# Patient Record
Sex: MALE | Race: WHITE | NOT HISPANIC OR LATINO | Employment: FULL TIME | URBAN - METROPOLITAN AREA
[De-identification: names, ages, dates, MRNs, and addresses within clinical notes are randomized per-mention and may not be internally consistent; named-entity substitution may affect disease eponyms.]

---

## 2017-06-22 ENCOUNTER — ALLSCRIPTS OFFICE VISIT (OUTPATIENT)
Dept: OTHER | Facility: OTHER | Age: 32
End: 2017-06-22

## 2018-01-13 VITALS
HEART RATE: 74 BPM | RESPIRATION RATE: 16 BRPM | WEIGHT: 188 LBS | TEMPERATURE: 97.4 F | SYSTOLIC BLOOD PRESSURE: 112 MMHG | BODY MASS INDEX: 26.92 KG/M2 | DIASTOLIC BLOOD PRESSURE: 72 MMHG | HEIGHT: 70 IN

## 2018-11-18 PROBLEM — J30.9 ALLERGIC RHINITIS: Status: ACTIVE | Noted: 2017-06-22

## 2018-11-18 RX ORDER — FLUTICASONE PROPIONATE 50 MCG
2 SPRAY, SUSPENSION (ML) NASAL DAILY
COMMUNITY
Start: 2017-06-22 | End: 2018-11-21 | Stop reason: ALTCHOICE

## 2018-11-18 RX ORDER — LORATADINE 10 MG/1
1 TABLET ORAL
COMMUNITY
Start: 2017-06-22 | End: 2018-11-21 | Stop reason: ALTCHOICE

## 2018-11-21 ENCOUNTER — OFFICE VISIT (OUTPATIENT)
Dept: FAMILY MEDICINE CLINIC | Facility: CLINIC | Age: 33
End: 2018-11-21
Payer: COMMERCIAL

## 2018-11-21 VITALS
DIASTOLIC BLOOD PRESSURE: 80 MMHG | SYSTOLIC BLOOD PRESSURE: 126 MMHG | WEIGHT: 208 LBS | RESPIRATION RATE: 16 BRPM | BODY MASS INDEX: 30.81 KG/M2 | HEIGHT: 69 IN | HEART RATE: 72 BPM | TEMPERATURE: 97.6 F

## 2018-11-21 DIAGNOSIS — M67.431 GANGLION CYST OF WRIST, RIGHT: Primary | ICD-10-CM

## 2018-11-21 DIAGNOSIS — Z13.1 SCREENING FOR DIABETES MELLITUS (DM): ICD-10-CM

## 2018-11-21 DIAGNOSIS — E66.9 OBESITY (BMI 30-39.9): ICD-10-CM

## 2018-11-21 DIAGNOSIS — Z13.89 SCREENING FOR CARDIOVASCULAR, RESPIRATORY, AND GENITOURINARY DISEASES: ICD-10-CM

## 2018-11-21 DIAGNOSIS — Z13.83 SCREENING FOR CARDIOVASCULAR, RESPIRATORY, AND GENITOURINARY DISEASES: ICD-10-CM

## 2018-11-21 DIAGNOSIS — Z13.6 SCREENING FOR CARDIOVASCULAR, RESPIRATORY, AND GENITOURINARY DISEASES: ICD-10-CM

## 2018-11-21 PROCEDURE — 3008F BODY MASS INDEX DOCD: CPT | Performed by: FAMILY MEDICINE

## 2018-11-21 PROCEDURE — 99213 OFFICE O/P EST LOW 20 MIN: CPT | Performed by: FAMILY MEDICINE

## 2018-11-21 PROCEDURE — 1036F TOBACCO NON-USER: CPT | Performed by: FAMILY MEDICINE

## 2018-11-21 NOTE — PROGRESS NOTES
Assessment/Plan:    No problem-specific Assessment & Plan notes found for this encounter  He can call anytime for a referral to Dr Lorin Benavidez if bothersome or tender  phm labs ordered     Diagnoses and all orders for this visit:    Ganglion cyst of wrist, right    Screening for cardiovascular, respiratory, and genitourinary diseases  -     Lipid Panel with Direct LDL reflex; Future    Screening for diabetes mellitus (DM)  -     Comprehensive metabolic panel; Future    Obesity (BMI 30-39  9)    BMI 30 0-30 9,adult    Other orders  -     Discontinue: fluticasone (FLONASE) 50 mcg/act nasal spray; 2 sprays into each nostril daily  -     Discontinue: loratadine (CLARITIN) 10 mg tablet; Take 1 tablet by mouth              No Follow-up on file  Subjective:      Patient ID: Tico Purvis is a 29 y o  male  Chief Complaint   Patient presents with    cyst on hand     right side       HPI  Lump right hand  No injury  Tender at times  Sometimes seems bigger and smaller  No trigger finger  No  weakness  For a while  Works in corrections    The following portions of the patient's history were reviewed and updated as appropriate: allergies, current medications, past family history, past medical history, past social history, past surgical history and problem list     Review of Systems   Musculoskeletal: Negative for arthralgias, joint swelling and myalgias  No current outpatient prescriptions on file  No current facility-administered medications for this visit  Objective:    /80   Pulse 72   Temp 97 6 °F (36 4 °C)   Resp 16   Ht 5' 9" (1 753 m)   Wt 94 3 kg (208 lb)   BMI 30 72 kg/m²        Physical Exam   Constitutional: He appears well-developed  HENT:   Head: Normocephalic  Eyes: Conjunctivae are normal    Neck: Neck supple  Cardiovascular: Normal rate and intact distal pulses  Pulmonary/Chest: Effort normal  No respiratory distress  Abdominal: Soft     Musculoskeletal: He exhibits no edema, tenderness or deformity  Right hand dorsum firm nodule suspect ganglion cyst, proximal 2nd metacarpal   Neurological: He is alert  Skin: Skin is warm and dry  No rash noted  No pallor  Psychiatric: His behavior is normal  Thought content normal    Nursing note and vitals reviewed               Mart Acosta,

## 2018-12-04 LAB
ALBUMIN SERPL-MCNC: 4.6 G/DL (ref 3.5–5.5)
ALBUMIN/GLOB SERPL: 1.6 {RATIO} (ref 1.2–2.2)
ALP SERPL-CCNC: 45 IU/L (ref 39–117)
ALT SERPL-CCNC: 19 IU/L (ref 0–44)
AST SERPL-CCNC: 20 IU/L (ref 0–40)
BILIRUB SERPL-MCNC: 0.4 MG/DL (ref 0–1.2)
BUN SERPL-MCNC: 21 MG/DL (ref 6–20)
BUN/CREAT SERPL: 19 (ref 9–20)
CALCIUM SERPL-MCNC: 9.9 MG/DL (ref 8.7–10.2)
CHLORIDE SERPL-SCNC: 99 MMOL/L (ref 96–106)
CHOLEST SERPL-MCNC: 176 MG/DL (ref 100–199)
CO2 SERPL-SCNC: 25 MMOL/L (ref 20–29)
CREAT SERPL-MCNC: 1.12 MG/DL (ref 0.76–1.27)
GLOBULIN SER-MCNC: 2.9 G/DL (ref 1.5–4.5)
GLUCOSE SERPL-MCNC: 98 MG/DL (ref 65–99)
HDLC SERPL-MCNC: 73 MG/DL
LABCORP COMMENT: NORMAL
LDLC SERPL CALC-MCNC: 88 MG/DL (ref 0–99)
MICRODELETION SYND BLD/T FISH: NORMAL
POTASSIUM SERPL-SCNC: 6 MMOL/L (ref 3.5–5.2)
PROT SERPL-MCNC: 7.5 G/DL (ref 6–8.5)
SL AMB EGFR AFRICAN AMERICAN: 100 ML/MIN/1.73
SL AMB EGFR NON AFRICAN AMERICAN: 86 ML/MIN/1.73
SODIUM SERPL-SCNC: 138 MMOL/L (ref 134–144)
TRIGL SERPL-MCNC: 74 MG/DL (ref 0–149)

## 2020-03-12 ENCOUNTER — OFFICE VISIT (OUTPATIENT)
Dept: FAMILY MEDICINE CLINIC | Facility: CLINIC | Age: 35
End: 2020-03-12
Payer: COMMERCIAL

## 2020-03-12 VITALS
BODY MASS INDEX: 30.36 KG/M2 | RESPIRATION RATE: 16 BRPM | TEMPERATURE: 98.5 F | DIASTOLIC BLOOD PRESSURE: 70 MMHG | HEIGHT: 69 IN | WEIGHT: 205 LBS | HEART RATE: 72 BPM | SYSTOLIC BLOOD PRESSURE: 120 MMHG

## 2020-03-12 DIAGNOSIS — Z13.89 SCREENING FOR CARDIOVASCULAR, RESPIRATORY, AND GENITOURINARY DISEASES: ICD-10-CM

## 2020-03-12 DIAGNOSIS — Z13.1 SCREENING FOR DIABETES MELLITUS (DM): ICD-10-CM

## 2020-03-12 DIAGNOSIS — J20.9 ACUTE BRONCHITIS, UNSPECIFIED ORGANISM: Primary | ICD-10-CM

## 2020-03-12 DIAGNOSIS — Z13.83 SCREENING FOR CARDIOVASCULAR, RESPIRATORY, AND GENITOURINARY DISEASES: ICD-10-CM

## 2020-03-12 DIAGNOSIS — Z13.6 SCREENING FOR CARDIOVASCULAR, RESPIRATORY, AND GENITOURINARY DISEASES: ICD-10-CM

## 2020-03-12 PROCEDURE — 3008F BODY MASS INDEX DOCD: CPT | Performed by: FAMILY MEDICINE

## 2020-03-12 PROCEDURE — 99213 OFFICE O/P EST LOW 20 MIN: CPT | Performed by: FAMILY MEDICINE

## 2020-03-12 RX ORDER — AZITHROMYCIN 250 MG/1
TABLET, FILM COATED ORAL
Qty: 6 TABLET | Refills: 0 | Status: SHIPPED | OUTPATIENT
Start: 2020-03-12 | End: 2020-03-17

## 2020-03-12 NOTE — PATIENT INSTRUCTIONS
Over-the-counter products can be useful for your symptoms:                      <<GUAIFENESIN>> is an expectorant that is useful for thick mucus  Often found in Robitussin and Mucinex products  <<DEXTROMETHORPHAN>> is a cough suppressant that works in the brain   to help reduce bothersome cough  Use with caution with other medications that work in the brain  <<ANTI-HISTAMINES>> are useful as allergy medications and to help dry up   bothersome thin secretions  Less sedating options include   Claritin, Zyrtec, Allegra and Xyzal and have generic OTC forms  <<PSEUDOEPHEDRINE and PHENYLEPHRINE>> are stimulant decongestants   that can be used for congestion and sinus pressure  Avoid or use with caution with high blood pressure or heart conditions  <<NASAL SPRAYS>> Steroid nasal sprays (flonase, nasacort) are used for allergies but   can be used for congestion also  Safe for high blood pressure  Afrin is a decongestant that works quickly but for up to 3 days        mucinex dm is a suggestion today

## 2020-03-12 NOTE — PROGRESS NOTES
Assessment/Plan:    No problem-specific Assessment & Plan notes found for this encounter  Diagnoses and all orders for this visit:    Acute bronchitis, unspecified organism  -     azithromycin (ZITHROMAX) 250 mg tablet; Take 500mg on day 1, 250mg on days 2-5    Screening for cardiovascular, respiratory, and genitourinary diseases  -     Lipid Panel with Direct LDL reflex; Future    Screening for diabetes mellitus (DM)  -     Comprehensive metabolic panel; Future              Return in about 6 weeks (around 4/21/2020) for Recheck  Subjective:      Patient ID: Sunil Dale is a 36 y o  male  Chief Complaint   Patient presents with    Cough     since friday  ac/cma    Sore Throat     from cough       HPI  Uri sx  About 1w  Coughing  np mostly  Worse with activity  Sick contacts  No fever  No nasal dc  No gi sx  Some otc advil multi sx  Slight sore throat from coughing  Better with rest  Corrections      The following portions of the patient's history were reviewed and updated as appropriate: allergies, current medications, past family history, past medical history, past social history, past surgical history and problem list     Review of Systems   Cardiovascular: Negative for chest pain  Neurological: Negative for dizziness  Current Outpatient Medications   Medication Sig Dispense Refill    azithromycin (ZITHROMAX) 250 mg tablet Take 500mg on day 1, 250mg on days 2-5 6 tablet 0     No current facility-administered medications for this visit  Objective:    /70   Pulse 72   Temp 98 5 °F (36 9 °C)   Resp 16   Ht 5' 9" (1 753 m)   Wt 93 kg (205 lb)   BMI 30 27 kg/m²        Physical Exam   Constitutional: He appears well-developed  No distress  HENT:   Head: Normocephalic  Right Ear: External ear normal    Left Ear: External ear normal    Eyes: Conjunctivae are normal  No scleral icterus  Neck: Neck supple     Cardiovascular: Normal rate, regular rhythm and intact distal pulses  Pulmonary/Chest: Effort normal  No respiratory distress  He has no wheezes  Coarse midline cough present  Abdominal: Soft  He exhibits no distension  Musculoskeletal: He exhibits no edema or deformity  Neurological: He is alert  Skin: Skin is warm and dry  No pallor  Psychiatric: His behavior is normal  Thought content normal    Nursing note and vitals reviewed  BMI Counseling: Body mass index is 30 27 kg/m²  The BMI is above normal  Nutrition recommendations include decreasing portion sizes and moderation in carbohydrate intake  Exercise recommendations include exercising 3-5 times per week  No pharmacotherapy was ordered                Jose Garcia DO

## 2020-03-12 NOTE — LETTER
March 12, 2020     Patient: Niya Cline   YOB: 1985   Date of Visit: 3/12/2020       To Whom it May Concern:    Roel Kapadia is under my professional care  He was seen in my office on 3/12/2020  Excuse from work on 3/8/20, 3/12/20 and 3/13/20  If you have any questions or concerns, please don't hesitate to call           Sincerely,          Sarath Merida DO        CC: No Recipients

## 2020-05-06 ENCOUNTER — TELEPHONE (OUTPATIENT)
Dept: FAMILY MEDICINE CLINIC | Facility: CLINIC | Age: 35
End: 2020-05-06

## 2020-09-19 LAB
ALBUMIN SERPL-MCNC: 4.9 G/DL (ref 4–5)
ALBUMIN/GLOB SERPL: 2.1 {RATIO} (ref 1.2–2.2)
ALP SERPL-CCNC: 46 IU/L (ref 39–117)
ALT SERPL-CCNC: 28 IU/L (ref 0–44)
AST SERPL-CCNC: 27 IU/L (ref 0–40)
BILIRUB SERPL-MCNC: 0.4 MG/DL (ref 0–1.2)
BUN SERPL-MCNC: 12 MG/DL (ref 6–20)
BUN/CREAT SERPL: 14 (ref 9–20)
CALCIUM SERPL-MCNC: 10 MG/DL (ref 8.7–10.2)
CHLORIDE SERPL-SCNC: 101 MMOL/L (ref 96–106)
CHOLEST SERPL-MCNC: 180 MG/DL (ref 100–199)
CO2 SERPL-SCNC: 24 MMOL/L (ref 20–29)
CREAT SERPL-MCNC: 0.87 MG/DL (ref 0.76–1.27)
GLOBULIN SER-MCNC: 2.3 G/DL (ref 1.5–4.5)
GLUCOSE SERPL-MCNC: 92 MG/DL (ref 65–99)
HDLC SERPL-MCNC: 76 MG/DL
LDLC SERPL CALC-MCNC: 94 MG/DL (ref 0–99)
MICRODELETION SYND BLD/T FISH: NORMAL
POTASSIUM SERPL-SCNC: 5.5 MMOL/L (ref 3.5–5.2)
PROT SERPL-MCNC: 7.2 G/DL (ref 6–8.5)
SL AMB EGFR AFRICAN AMERICAN: 130 ML/MIN/1.73
SL AMB EGFR NON AFRICAN AMERICAN: 113 ML/MIN/1.73
SODIUM SERPL-SCNC: 140 MMOL/L (ref 134–144)
TRIGL SERPL-MCNC: 48 MG/DL (ref 0–149)

## 2021-01-19 ENCOUNTER — OFFICE VISIT (OUTPATIENT)
Dept: FAMILY MEDICINE CLINIC | Facility: CLINIC | Age: 36
End: 2021-01-19
Payer: COMMERCIAL

## 2021-01-19 VITALS
SYSTOLIC BLOOD PRESSURE: 114 MMHG | RESPIRATION RATE: 16 BRPM | WEIGHT: 212 LBS | HEART RATE: 83 BPM | BODY MASS INDEX: 32.13 KG/M2 | TEMPERATURE: 97.5 F | HEIGHT: 68 IN | DIASTOLIC BLOOD PRESSURE: 70 MMHG | OXYGEN SATURATION: 95 %

## 2021-01-19 DIAGNOSIS — Z00.00 HEALTHCARE MAINTENANCE: Primary | ICD-10-CM

## 2021-01-19 DIAGNOSIS — E66.9 OBESITY (BMI 30-39.9): ICD-10-CM

## 2021-01-19 DIAGNOSIS — Z23 IMMUNIZATION DUE: ICD-10-CM

## 2021-01-19 PROBLEM — J20.9 ACUTE BRONCHITIS: Status: RESOLVED | Noted: 2020-03-12 | Resolved: 2021-01-19

## 2021-01-19 PROCEDURE — 1036F TOBACCO NON-USER: CPT | Performed by: FAMILY MEDICINE

## 2021-01-19 PROCEDURE — 90715 TDAP VACCINE 7 YRS/> IM: CPT

## 2021-01-19 PROCEDURE — 90471 IMMUNIZATION ADMIN: CPT

## 2021-01-19 PROCEDURE — 99395 PREV VISIT EST AGE 18-39: CPT | Performed by: FAMILY MEDICINE

## 2021-01-19 PROCEDURE — 3008F BODY MASS INDEX DOCD: CPT | Performed by: FAMILY MEDICINE

## 2021-01-19 PROCEDURE — 3725F SCREEN DEPRESSION PERFORMED: CPT | Performed by: FAMILY MEDICINE

## 2021-01-19 RX ORDER — PHENTERMINE HYDROCHLORIDE 37.5 MG/1
37.5 TABLET ORAL EVERY MORNING
Qty: 90 TABLET | Refills: 0 | Status: SHIPPED | OUTPATIENT
Start: 2021-01-19 | End: 2021-04-02 | Stop reason: SDUPTHER

## 2021-01-19 NOTE — PROGRESS NOTES
Assessment/Plan:    No problem-specific Assessment & Plan notes found for this encounter  cpe  Start fastin 1/2 pill first 2w and f/u  Continue TLC measures    Denies palpitations, chest pain, mood changes, irritability, EAST, edema or syncope  Use of phentermine as an adjunct to diet/exercise program aware  Accepts risks of medications as discussed  Diagnoses and all orders for this visit:    Healthcare maintenance    BMI 30 0-30 9,adult    Immunization due  -     TDAP VACCINE GREATER THAN OR EQUAL TO 6YO IM    Obesity (BMI 30-39 9)  -     phentermine (ADIPEX-P) 37 5 MG tablet; Take 1 tablet (37 5 mg total) by mouth every morning (before breakfast)              Return in about 2 weeks (around 2/2/2021) for Recheck  Subjective:      Patient ID: Alexey Rosenhtal is a 30 y o  male  Chief Complaint   Patient presents with    Physical Exam     wmcma       HPI  Working out  Cardio  Working on core    Mostly good with diet  Bad over holidays  [place]  Mostly vegetarian, infreq meat  Does portion control already    Never been on wt loss of meds  No hx of mood issues    [employer]rections work    The following portions of the patient's history were reviewed and updated as appropriate: allergies, current medications, past family history, past medical history, past social history, past surgical history and problem list     Review of Systems   Respiratory: Negative for shortness of breath  Cardiovascular: Negative for chest pain and palpitations  Current Outpatient Medications   Medication Sig Dispense Refill    phentermine (ADIPEX-P) 37 5 MG tablet Take 1 tablet (37 5 mg total) by mouth every morning (before breakfast) 90 tablet 0     No current facility-administered medications for this visit          Objective:    /70   Pulse 83   Temp 97 5 °F (36 4 °C)   Resp 16   Ht 5' 8" (1 727 m)   Wt 96 2 kg (212 lb)   SpO2 95%   BMI 32 23 kg/m²        Physical Exam  Vitals signs and nursing note reviewed  Constitutional:       Appearance: He is well-developed  He is obese  HENT:      Head: Normocephalic  Right Ear: Tympanic membrane normal       Left Ear: Tympanic membrane normal    Eyes:      General: No scleral icterus  Conjunctiva/sclera: Conjunctivae normal    Neck:      Musculoskeletal: Neck supple  Cardiovascular:      Rate and Rhythm: Normal rate and regular rhythm  Heart sounds: No murmur  Pulmonary:      Effort: Pulmonary effort is normal  No respiratory distress  Breath sounds: No wheezing  Abdominal:      Palpations: Abdomen is soft  Hernia: No hernia is present  Genitourinary:     Penis: Normal        Scrotum/Testes: Normal    Musculoskeletal:         General: No deformity  Right lower leg: No edema  Left lower leg: No edema  Skin:     General: Skin is warm and dry  Neurological:      Mental Status: He is alert  Motor: No weakness  Gait: Gait normal    Psychiatric:         Behavior: Behavior normal          Thought Content: Thought content normal          BMI Counseling: Body mass index is 32 23 kg/m²  The BMI is above normal  Nutrition recommendations include decreasing portion sizes and moderation in carbohydrate intake  Exercise recommendations include exercising 3-5 times per week  No pharmacotherapy was ordered                Remonia Lunch, DO

## 2021-02-03 ENCOUNTER — OFFICE VISIT (OUTPATIENT)
Dept: FAMILY MEDICINE CLINIC | Facility: CLINIC | Age: 36
End: 2021-02-03
Payer: COMMERCIAL

## 2021-02-03 VITALS
HEIGHT: 68 IN | WEIGHT: 206 LBS | SYSTOLIC BLOOD PRESSURE: 122 MMHG | BODY MASS INDEX: 31.22 KG/M2 | TEMPERATURE: 96.9 F | DIASTOLIC BLOOD PRESSURE: 78 MMHG | HEART RATE: 68 BPM | RESPIRATION RATE: 16 BRPM

## 2021-02-03 DIAGNOSIS — E66.9 OBESITY (BMI 30-39.9): ICD-10-CM

## 2021-02-03 PROCEDURE — 99213 OFFICE O/P EST LOW 20 MIN: CPT | Performed by: FAMILY MEDICINE

## 2021-02-03 NOTE — PROGRESS NOTES
Assessment/Plan:    No problem-specific Assessment & Plan notes found for this encounter  Doing well on 1/2 tab  Tolerating w/o side effects  Mood/cardiac exam normal    Increase to 1 po qd  Continue diet/exercise efforts  Risks/benefits of medication advised  No cardiac abnormalities on exam     Diagnoses and all orders for this visit:    BMI 30 0-30 9,adult    Obesity (BMI 30-39  9)        Return in about 1 month (around 3/3/2021)  Subjective:      Patient ID: Malik Parkinson is a 30 y o  male  Chief Complaint   Patient presents with    Follow-up     f/u medications ss,lpn       HPI  Taking meds  1/2 pill    Some waking lately    Notes wt loss at home, 10#    Less hungry  Watching calories    Denies palpitations, chest pain, mood changes, irritability, EAST, edema or syncope  Use of phentermine as an adjunct to diet/exercise program aware  Accepts risks of medications as discussed  The following portions of the patient's history were reviewed and updated as appropriate: allergies, current medications, past family history, past medical history, past social history, past surgical history and problem list     Review of Systems   Cardiovascular: Negative for chest pain, palpitations and leg swelling  Psychiatric/Behavioral: Negative for dysphoric mood  The patient is not nervous/anxious  Current Outpatient Medications   Medication Sig Dispense Refill    phentermine (ADIPEX-P) 37 5 MG tablet Take 1 tablet (37 5 mg total) by mouth every morning (before breakfast) 90 tablet 0     No current facility-administered medications for this visit  Objective:    /78   Pulse 68   Temp (!) 96 9 °F (36 1 °C)   Resp 16   Ht 5' 8" (1 727 m)   Wt 93 4 kg (206 lb)   BMI 31 32 kg/m²        Physical Exam  Vitals signs and nursing note reviewed  Constitutional:       Appearance: He is well-developed  He is obese  He is not ill-appearing  HENT:      Head: Normocephalic        Right Ear: Tympanic membrane normal       Left Ear: Tympanic membrane normal    Eyes:      Conjunctiva/sclera: Conjunctivae normal    Neck:      Musculoskeletal: Neck supple  Vascular: No carotid bruit  Cardiovascular:      Rate and Rhythm: Normal rate and regular rhythm  Heart sounds: No murmur  Pulmonary:      Effort: Pulmonary effort is normal  No respiratory distress  Breath sounds: No wheezing  Abdominal:      Palpations: Abdomen is soft  Musculoskeletal:         General: No deformity  Skin:     General: Skin is warm and dry  Coloration: Skin is not jaundiced or pale  Neurological:      Mental Status: He is alert  Motor: No weakness  Gait: Gait normal    Psychiatric:         Mood and Affect: Mood normal          Behavior: Behavior normal          Thought Content:  Thought content normal                 Tiny Frazier DO

## 2021-03-02 ENCOUNTER — OFFICE VISIT (OUTPATIENT)
Dept: FAMILY MEDICINE CLINIC | Facility: CLINIC | Age: 36
End: 2021-03-02
Payer: COMMERCIAL

## 2021-03-02 VITALS
BODY MASS INDEX: 30.31 KG/M2 | HEART RATE: 80 BPM | HEIGHT: 68 IN | WEIGHT: 200 LBS | DIASTOLIC BLOOD PRESSURE: 70 MMHG | TEMPERATURE: 96.1 F | RESPIRATION RATE: 16 BRPM | SYSTOLIC BLOOD PRESSURE: 114 MMHG

## 2021-03-02 DIAGNOSIS — E66.9 OBESITY (BMI 30-39.9): ICD-10-CM

## 2021-03-02 PROCEDURE — 3725F SCREEN DEPRESSION PERFORMED: CPT | Performed by: FAMILY MEDICINE

## 2021-03-02 PROCEDURE — 1036F TOBACCO NON-USER: CPT | Performed by: FAMILY MEDICINE

## 2021-03-02 PROCEDURE — 99213 OFFICE O/P EST LOW 20 MIN: CPT | Performed by: FAMILY MEDICINE

## 2021-03-02 NOTE — PROGRESS NOTES
Assessment/Plan:    No problem-specific Assessment & Plan notes found for this encounter  Obesity  Tolerating meds  Cont as adjunct to efforts  F/u 1m  Risks/benefits discussed     Diagnoses and all orders for this visit:    BMI 30 0-30 9,adult    Obesity (BMI 30-39  9)              Return in about 1 month (around 4/2/2021) for Recheck  Subjective:      Patient ID: Alexey Rosenthal is a 30 y o  male  Chief Complaint   Patient presents with    Follow-up     one month follow up ss,lpn       HPI    Denies palpitations, chest pain, mood changes, irritability, EAST, edema or syncope  Use of phentermine as an adjunct to diet/exercise program aware  Accepts risks of medications as discussed  Compliant with diet/exercise still    The following portions of the patient's history were reviewed and updated as appropriate: allergies, current medications, past family history, past medical history, past social history, past surgical history and problem list     Review of Systems   Cardiovascular: Negative for chest pain, palpitations and leg swelling  Current Outpatient Medications   Medication Sig Dispense Refill    phentermine (ADIPEX-P) 37 5 MG tablet Take 1 tablet (37 5 mg total) by mouth every morning (before breakfast) 90 tablet 0     No current facility-administered medications for this visit  Objective:    /70   Pulse 80   Temp (!) 96 1 °F (35 6 °C)   Resp 16   Ht 5' 8" (1 727 m)   Wt 90 7 kg (200 lb)   BMI 30 41 kg/m²        Physical Exam  Vitals signs and nursing note reviewed  Constitutional:       Appearance: He is well-developed  He is obese  He is not ill-appearing  HENT:      Head: Normocephalic  Eyes:      General: No scleral icterus  Conjunctiva/sclera: Conjunctivae normal    Neck:      Musculoskeletal: Neck supple  Cardiovascular:      Rate and Rhythm: Normal rate and regular rhythm  Heart sounds: Normal heart sounds  No murmur  No gallop      Pulmonary: Effort: Pulmonary effort is normal  No respiratory distress  Abdominal:      Palpations: Abdomen is soft  Musculoskeletal:         General: No deformity  Right lower leg: No edema  Left lower leg: No edema  Skin:     General: Skin is warm and dry  Coloration: Skin is not pale  Neurological:      Mental Status: He is alert  Psychiatric:         Behavior: Behavior normal          Thought Content:  Thought content normal                 Pola Krishna DO

## 2021-04-02 ENCOUNTER — OFFICE VISIT (OUTPATIENT)
Dept: FAMILY MEDICINE CLINIC | Facility: CLINIC | Age: 36
End: 2021-04-02
Payer: COMMERCIAL

## 2021-04-02 VITALS
TEMPERATURE: 97.5 F | HEART RATE: 73 BPM | DIASTOLIC BLOOD PRESSURE: 70 MMHG | SYSTOLIC BLOOD PRESSURE: 106 MMHG | WEIGHT: 193 LBS | BODY MASS INDEX: 29.25 KG/M2 | RESPIRATION RATE: 16 BRPM | OXYGEN SATURATION: 99 % | HEIGHT: 68 IN

## 2021-04-02 DIAGNOSIS — E66.9 OBESITY (BMI 30-39.9): Primary | ICD-10-CM

## 2021-04-02 PROCEDURE — 99214 OFFICE O/P EST MOD 30 MIN: CPT | Performed by: FAMILY MEDICINE

## 2021-04-02 PROCEDURE — 3008F BODY MASS INDEX DOCD: CPT | Performed by: FAMILY MEDICINE

## 2021-04-02 PROCEDURE — 1036F TOBACCO NON-USER: CPT | Performed by: FAMILY MEDICINE

## 2021-04-02 RX ORDER — PHENTERMINE HYDROCHLORIDE 37.5 MG/1
37.5 TABLET ORAL EVERY MORNING
Qty: 90 TABLET | Refills: 0 | Status: SHIPPED | OUTPATIENT
Start: 2021-04-02 | End: 2021-07-16 | Stop reason: SDUPTHER

## 2021-04-02 NOTE — PROGRESS NOTES
Assessment/Plan:    No problem-specific Assessment & Plan notes found for this encounter  Obesity  Improving  Appetite is especially responding  Continues to do all diet/exercise efforts  Continue phentermine, risks aware  F/u monthly    Questions about covid vaccine answered in detail yet he remains undecided    26 minutes spent with patient with over 50% of the time spent counseling  Diagnoses and all orders for this visit:    Obesity (BMI 30-39 9)  -     phentermine (ADIPEX-P) 37 5 MG tablet; Take 1 tablet (37 5 mg total) by mouth every morning (before breakfast)    BMI 29 0-29 9,adult          Return in about 1 month (around 5/2/2021)  Subjective:      Patient ID: Brodie Keen is a 30 y o  male  Chief Complaint   Patient presents with    Follow-up     1 month  mz cma       HPI  Lost another 7#  Feels it was about his inability to control his appetite  Feels better overall  More energy  Takes medication in am  No anxiety/palpitations/stimulation even with drinking coffee in am    Denies palpitations, chest pain, mood changes, irritability, EAST, edema or syncope  Use of phentermine as an adjunct to diet/exercise program aware  Accepts risks of medications as discussed  The following portions of the patient's history were reviewed and updated as appropriate: allergies, current medications, past family history, past medical history, past social history, past surgical history and problem list     Review of Systems   Respiratory: Negative for shortness of breath  Cardiovascular: Negative for chest pain, palpitations and leg swelling  Current Outpatient Medications   Medication Sig Dispense Refill    phentermine (ADIPEX-P) 37 5 MG tablet Take 1 tablet (37 5 mg total) by mouth every morning (before breakfast) 90 tablet 0     No current facility-administered medications for this visit          Objective:    /70   Pulse 73   Temp 97 5 °F (36 4 °C)   Resp 16   Ht 5' 8" (1 727 m)   Wt 87 5 kg (193 lb)   SpO2 99%   BMI 29 35 kg/m²        Physical Exam  Vitals signs and nursing note reviewed  Constitutional:       General: He is not in acute distress  Appearance: He is well-developed  He is not ill-appearing  HENT:      Head: Normocephalic  Right Ear: Tympanic membrane normal       Left Ear: Tympanic membrane normal    Eyes:      General: No scleral icterus  Conjunctiva/sclera: Conjunctivae normal    Neck:      Musculoskeletal: Neck supple  Cardiovascular:      Rate and Rhythm: Normal rate and regular rhythm  Heart sounds: No murmur  No gallop  Pulmonary:      Effort: Pulmonary effort is normal  No respiratory distress  Breath sounds: No wheezing or rales  Abdominal:      General: There is no distension  Palpations: Abdomen is soft  Tenderness: There is no abdominal tenderness  Musculoskeletal:         General: No deformity  Right lower leg: No edema  Left lower leg: No edema  Skin:     General: Skin is warm and dry  Coloration: Skin is not pale  Neurological:      Mental Status: He is alert  Psychiatric:         Behavior: Behavior normal          Thought Content:  Thought content normal                 Liza Rios DO

## 2021-05-04 ENCOUNTER — OFFICE VISIT (OUTPATIENT)
Dept: FAMILY MEDICINE CLINIC | Facility: CLINIC | Age: 36
End: 2021-05-04
Payer: COMMERCIAL

## 2021-05-04 ENCOUNTER — TELEPHONE (OUTPATIENT)
Dept: FAMILY MEDICINE CLINIC | Facility: CLINIC | Age: 36
End: 2021-05-04

## 2021-05-04 VITALS
HEIGHT: 68 IN | OXYGEN SATURATION: 98 % | SYSTOLIC BLOOD PRESSURE: 126 MMHG | DIASTOLIC BLOOD PRESSURE: 78 MMHG | TEMPERATURE: 98.8 F | BODY MASS INDEX: 29.19 KG/M2 | WEIGHT: 192.6 LBS | HEART RATE: 98 BPM | RESPIRATION RATE: 18 BRPM

## 2021-05-04 DIAGNOSIS — Z11.9 ENCOUNTER FOR SCREENING FOR INFECTIOUS AND PARASITIC DISEASES, UNSPECIFIED: Primary | ICD-10-CM

## 2021-05-04 DIAGNOSIS — E66.9 OBESITY (BMI 30-39.9): Primary | ICD-10-CM

## 2021-05-04 PROCEDURE — 99213 OFFICE O/P EST LOW 20 MIN: CPT | Performed by: FAMILY MEDICINE

## 2021-05-04 NOTE — TELEPHONE ENCOUNTER
DR Gaurav Hinton    Patient needs an order for a covid test to travel  Needs to go to urgent care because he needs to do it after 5pm     Please place order  Call patient when ready

## 2021-05-04 NOTE — PROGRESS NOTES
Assessment/Plan:    No problem-specific Assessment & Plan notes found for this encounter  Cont fastin  Not much wt loss in past month due to bday festivities and indulgence    He will get back to diet/exercise efforts and recheck 1m    Needs to lose wt progressively to continue meds with allowance for muscle     Diagnoses and all orders for this visit:    Obesity (BMI 30-39  9)    BMI 29 0-29 9,adult          Return in about 1 month (around 6/4/2021) for Recheck  Subjective:      Patient ID: Belgica Torres is a 29 y o  male  Chief Complaint   Patient presents with    Follow-up     1 mo f/u  jmcma       HPI    Recent bday  Ate and drank a lot  Cake, chicken, beer  excessive    Still taking meds  Working out  1-2d bad diet   back on track    The following portions of the patient's history were reviewed and updated as appropriate: allergies, current medications, past family history, past medical history, past social history, past surgical history and problem list     Review of Systems   Cardiovascular: Negative for chest pain, palpitations and leg swelling  Current Outpatient Medications   Medication Sig Dispense Refill    phentermine (ADIPEX-P) 37 5 MG tablet Take 1 tablet (37 5 mg total) by mouth every morning (before breakfast) 90 tablet 0     No current facility-administered medications for this visit  Objective:    /78   Pulse 98   Temp 98 8 °F (37 1 °C)   Resp 18   Ht 5' 8" (1 727 m)   Wt 87 4 kg (192 lb 9 6 oz)   SpO2 98%   BMI 29 28 kg/m²        Physical Exam  Vitals signs and nursing note reviewed  Constitutional:       General: He is not in acute distress  Appearance: He is well-developed  He is not ill-appearing  HENT:      Head: Normocephalic  Eyes:      Conjunctiva/sclera: Conjunctivae normal    Neck:      Musculoskeletal: Neck supple  Cardiovascular:      Rate and Rhythm: Normal rate and regular rhythm  Heart sounds: No murmur     Pulmonary: Effort: Pulmonary effort is normal  No respiratory distress  Abdominal:      Palpations: Abdomen is soft  Musculoskeletal:         General: No deformity  Skin:     General: Skin is warm and dry  Neurological:      Mental Status: He is alert  Psychiatric:         Behavior: Behavior normal          Thought Content:  Thought content normal                 Steffanie Islas DO

## 2021-05-05 DIAGNOSIS — Z11.9 ENCOUNTER FOR SCREENING FOR INFECTIOUS AND PARASITIC DISEASES, UNSPECIFIED: ICD-10-CM

## 2021-05-05 PROCEDURE — U0003 INFECTIOUS AGENT DETECTION BY NUCLEIC ACID (DNA OR RNA); SEVERE ACUTE RESPIRATORY SYNDROME CORONAVIRUS 2 (SARS-COV-2) (CORONAVIRUS DISEASE [COVID-19]), AMPLIFIED PROBE TECHNIQUE, MAKING USE OF HIGH THROUGHPUT TECHNOLOGIES AS DESCRIBED BY CMS-2020-01-R: HCPCS | Performed by: FAMILY MEDICINE

## 2021-05-05 PROCEDURE — U0005 INFEC AGEN DETEC AMPLI PROBE: HCPCS | Performed by: FAMILY MEDICINE

## 2021-05-06 LAB — SARS-COV-2 RNA RESP QL NAA+PROBE: NEGATIVE

## 2021-05-07 ENCOUNTER — TELEPHONE (OUTPATIENT)
Dept: FAMILY MEDICINE CLINIC | Facility: CLINIC | Age: 36
End: 2021-05-07

## 2021-05-07 NOTE — TELEPHONE ENCOUNTER
----- Message from Noris Braga DO sent at 5/6/2021 10:15 PM EDT -----  Please inform his that test is negative

## 2021-06-01 ENCOUNTER — OFFICE VISIT (OUTPATIENT)
Dept: FAMILY MEDICINE CLINIC | Facility: CLINIC | Age: 36
End: 2021-06-01
Payer: COMMERCIAL

## 2021-06-01 VITALS
SYSTOLIC BLOOD PRESSURE: 114 MMHG | BODY MASS INDEX: 29.86 KG/M2 | WEIGHT: 197 LBS | OXYGEN SATURATION: 99 % | HEIGHT: 68 IN | DIASTOLIC BLOOD PRESSURE: 80 MMHG | TEMPERATURE: 97.5 F | RESPIRATION RATE: 16 BRPM | HEART RATE: 98 BPM

## 2021-06-01 DIAGNOSIS — L25.5 CONTACT DERMATITIS DUE TO PLANT: Primary | ICD-10-CM

## 2021-06-01 PROCEDURE — 96372 THER/PROPH/DIAG INJ SC/IM: CPT

## 2021-06-01 PROCEDURE — 3008F BODY MASS INDEX DOCD: CPT | Performed by: FAMILY MEDICINE

## 2021-06-01 PROCEDURE — 99213 OFFICE O/P EST LOW 20 MIN: CPT | Performed by: FAMILY MEDICINE

## 2021-06-01 PROCEDURE — 1036F TOBACCO NON-USER: CPT | Performed by: FAMILY MEDICINE

## 2021-06-01 RX ORDER — METHYLPREDNISOLONE ACETATE 80 MG/ML
80 INJECTION, SUSPENSION INTRA-ARTICULAR; INTRALESIONAL; INTRAMUSCULAR; SOFT TISSUE ONCE
Status: COMPLETED | OUTPATIENT
Start: 2021-06-01 | End: 2021-06-01

## 2021-06-01 RX ADMIN — METHYLPREDNISOLONE ACETATE 80 MG: 80 INJECTION, SUSPENSION INTRA-ARTICULAR; INTRALESIONAL; INTRAMUSCULAR; SOFT TISSUE at 17:16

## 2021-06-01 NOTE — PROGRESS NOTES
Assessment/Plan:    No problem-specific Assessment & Plan notes found for this encounter  Contact derm new  Steroid risks aware  Use h1b po    bmi noted  He had excess water  Continue meds and efforts  F/u 1m     Diagnoses and all orders for this visit:    Contact dermatitis due to plant  -     methylPREDNISolone acetate (DEPO-MEDROL) injection 80 mg    BMI 29 0-29 9,adult        Return in about 1 month (around 7/1/2021) for Recheck  Subjective:      Patient ID: Jagdeep Nelson is a 29 y o  male  Chief Complaint   Patient presents with   Sophy Keen       HPI    Yard work  Poison ivy in past  Had come in contact with in past  No sob or fever  No covid shot planned or received   Washed since exposure  Rash for 3d  On claritin currently  Benadryl at hs    Denies palpitations, chest pain, mood changes, irritability, EAST, edema or syncope  Use of phentermine as an adjunct to diet/exercise program aware  Accepts risks of medications as discussed  Expected wt loss aware  Drank a gallon a water before coming in which was extra after a very sweaty workout    The following portions of the patient's history were reviewed and updated as appropriate: allergies, current medications, past family history, past medical history, past social history, past surgical history and problem list     Review of Systems   Constitutional: Negative for fever  Respiratory: Negative for shortness of breath  Current Outpatient Medications   Medication Sig Dispense Refill    phentermine (ADIPEX-P) 37 5 MG tablet Take 1 tablet (37 5 mg total) by mouth every morning (before breakfast) 90 tablet 0     No current facility-administered medications for this visit  Objective:    /80   Pulse 98   Temp 97 5 °F (36 4 °C)   Resp 16   Ht 5' 8" (1 727 m)   Wt 89 4 kg (197 lb)   SpO2 99%   BMI 29 95 kg/m²        Physical Exam  Vitals signs and nursing note reviewed     Constitutional:       Appearance: He is well-developed  HENT:      Head: Normocephalic  Eyes:      Conjunctiva/sclera: Conjunctivae normal    Neck:      Musculoskeletal: Neck supple  Cardiovascular:      Rate and Rhythm: Normal rate and regular rhythm  Heart sounds: No murmur  Pulmonary:      Effort: Pulmonary effort is normal  No respiratory distress  Abdominal:      Palpations: Abdomen is soft  Musculoskeletal:         General: No deformity  Skin:     General: Skin is warm and dry  Findings: Rash present  Comments: Vesicular on arms and legs b/l   Neurological:      Mental Status: He is alert  Motor: No weakness  Gait: Gait normal    Psychiatric:         Behavior: Behavior normal          Thought Content:  Thought content normal                 Aracely Thakur DO

## 2021-07-16 ENCOUNTER — OFFICE VISIT (OUTPATIENT)
Dept: FAMILY MEDICINE CLINIC | Facility: CLINIC | Age: 36
End: 2021-07-16
Payer: COMMERCIAL

## 2021-07-16 VITALS
DIASTOLIC BLOOD PRESSURE: 66 MMHG | WEIGHT: 189 LBS | HEART RATE: 82 BPM | SYSTOLIC BLOOD PRESSURE: 120 MMHG | TEMPERATURE: 97.4 F | BODY MASS INDEX: 28.64 KG/M2 | RESPIRATION RATE: 16 BRPM | HEIGHT: 68 IN

## 2021-07-16 DIAGNOSIS — E66.9 OBESITY (BMI 30-39.9): ICD-10-CM

## 2021-07-16 PROBLEM — L25.5 CONTACT DERMATITIS DUE TO PLANT: Status: RESOLVED | Noted: 2021-06-01 | Resolved: 2021-07-16

## 2021-07-16 PROCEDURE — 3008F BODY MASS INDEX DOCD: CPT | Performed by: FAMILY MEDICINE

## 2021-07-16 PROCEDURE — 99213 OFFICE O/P EST LOW 20 MIN: CPT | Performed by: FAMILY MEDICINE

## 2021-07-16 RX ORDER — PHENTERMINE HYDROCHLORIDE 37.5 MG/1
37.5 TABLET ORAL EVERY MORNING
Qty: 90 TABLET | Refills: 0 | Status: SHIPPED | OUTPATIENT
Start: 2021-07-16 | End: 2021-09-17 | Stop reason: SDUPTHER

## 2021-07-16 NOTE — PROGRESS NOTES
Assessment/Plan:    No problem-specific Assessment & Plan notes found for this encounter  Obesity improving  Cont meds as adjunct to diet and exercise  F/u 1m     Diagnoses and all orders for this visit:    BMI 28 0-28 9,adult    Obesity (BMI 30-39 9)  -     phentermine (ADIPEX-P) 37 5 MG tablet; Take 1 tablet (37 5 mg total) by mouth every morning (before breakfast)        Return in about 1 month (around 8/16/2021) for Recheck  Subjective:      Patient ID: [place] Christopher is a 29 y o  male  Chief Complaint   Patient presents with    Follow-up     medication ac/lpn       HPI    Working on calories and portions  Strict  Healthy snacks  Daily exercise  Cardio    Clothes fitting better  Still losing wt  Feels good overall    Denies palpitations, chest pain, mood changes, irritability, EAST, edema or syncope  Use of phentermine as an adjunct to diet/exercise program aware  Accepts risks of medications as discussed  The following portions of the patient's history were reviewed and updated as appropriate: allergies, current medications, past family history, past medical history, past social history, past surgical history and problem list     Review of Systems   Respiratory: Negative for shortness of breath  Cardiovascular: Negative for chest pain, palpitations and leg swelling  Current Outpatient Medications   Medication Sig Dispense Refill    phentermine (ADIPEX-P) 37 5 MG tablet Take 1 tablet (37 5 mg total) by mouth every morning (before breakfast) 90 tablet 0     No current facility-administered medications for this visit  Objective:    /66   Pulse 82   Temp (!) 97 4 °F (36 3 °C)   Resp 16   Ht 5' 8" (1 727 m)   Wt 85 7 kg (189 lb)   BMI 28 74 kg/m²        Physical Exam  Vitals and nursing note reviewed  Constitutional:       General: He is not in acute distress  Appearance: He is well-developed  He is obese  He is not ill-appearing  HENT:      Head: Normocephalic  Right Ear: Tympanic membrane normal       Left Ear: Tympanic membrane normal    Eyes:      General: No scleral icterus  Conjunctiva/sclera: Conjunctivae normal    Cardiovascular:      Rate and Rhythm: Normal rate and regular rhythm  Heart sounds: No murmur heard  Pulmonary:      Effort: Pulmonary effort is normal  No respiratory distress  Breath sounds: No wheezing  Abdominal:      Palpations: Abdomen is soft  Musculoskeletal:         General: No deformity  Cervical back: Neck supple  Skin:     General: Skin is warm and dry  Coloration: Skin is not pale  Neurological:      Mental Status: He is alert  Motor: No weakness  Gait: Gait normal    Psychiatric:         Mood and Affect: Mood normal          Behavior: Behavior normal          Thought Content:  Thought content normal                 Dayhoit Alert, DO

## 2021-08-17 ENCOUNTER — OFFICE VISIT (OUTPATIENT)
Dept: FAMILY MEDICINE CLINIC | Facility: CLINIC | Age: 36
End: 2021-08-17
Payer: COMMERCIAL

## 2021-08-17 VITALS
TEMPERATURE: 98 F | RESPIRATION RATE: 18 BRPM | WEIGHT: 189 LBS | HEIGHT: 68 IN | OXYGEN SATURATION: 97 % | BODY MASS INDEX: 28.64 KG/M2 | SYSTOLIC BLOOD PRESSURE: 112 MMHG | DIASTOLIC BLOOD PRESSURE: 80 MMHG | HEART RATE: 83 BPM

## 2021-08-17 DIAGNOSIS — E66.3 OVERWEIGHT (BMI 25.0-29.9): Primary | ICD-10-CM

## 2021-08-17 DIAGNOSIS — J30.9 ALLERGIC RHINITIS, UNSPECIFIED SEASONALITY, UNSPECIFIED TRIGGER: ICD-10-CM

## 2021-08-17 PROCEDURE — 1036F TOBACCO NON-USER: CPT | Performed by: FAMILY MEDICINE

## 2021-08-17 PROCEDURE — 99213 OFFICE O/P EST LOW 20 MIN: CPT | Performed by: FAMILY MEDICINE

## 2021-08-17 PROCEDURE — 3008F BODY MASS INDEX DOCD: CPT | Performed by: FAMILY MEDICINE

## 2021-08-17 NOTE — PROGRESS NOTES
Assessment/Plan:    No problem-specific Assessment & Plan notes found for this encounter  bmi noted  Continue phentermine as adjunct  Continue exercise/diet  Try to measure dimensions since wt may not be the whole story    AR stable     Diagnoses and all orders for this visit:    Overweight (BMI 25 0-29  9)    BMI 28 0-28 9,adult    Allergic rhinitis, unspecified seasonality, unspecified trigger        Return in about 1 month (around 9/17/2021) for Recheck  Subjective:      Patient ID: Marek Bateman is a 29 y o  male  Chief Complaint   Patient presents with    Follow-up     1 month   sas/cma       HPI  Still losing dimension though weight same  Follows diet  5d/w exercise  Cardio and weights  Drinking water well    Denies palpitations, chest pain, mood changes, irritability, EAST, edema or syncope  Use of phentermine as an adjunct to diet/exercise program aware  Accepts risks of medications as discussed  The following portions of the patient's history were reviewed and updated as appropriate: allergies, current medications, past family history, past medical history, past social history, past surgical history and problem list     Review of Systems   Cardiovascular: Negative for chest pain, palpitations and leg swelling  Current Outpatient Medications   Medication Sig Dispense Refill    phentermine (ADIPEX-P) 37 5 MG tablet Take 1 tablet (37 5 mg total) by mouth every morning (before breakfast) 90 tablet 0     No current facility-administered medications for this visit  Objective:    /80   Pulse 83   Temp 98 °F (36 7 °C)   Resp 18   Ht 5' 8" (1 727 m)   Wt 85 7 kg (189 lb)   SpO2 97%   BMI 28 74 kg/m²        Physical Exam  Vitals and nursing note reviewed  Constitutional:       General: He is not in acute distress  Appearance: He is well-developed  He is obese  He is not ill-appearing  HENT:      Head: Normocephalic     Eyes:      Conjunctiva/sclera: Conjunctivae normal    Cardiovascular:      Rate and Rhythm: Normal rate and regular rhythm  Heart sounds: No murmur heard  Pulmonary:      Effort: Pulmonary effort is normal  No respiratory distress  Abdominal:      Palpations: Abdomen is soft  Musculoskeletal:         General: No deformity  Cervical back: Neck supple  Right lower leg: No edema  Left lower leg: No edema  Skin:     General: Skin is warm and dry  Neurological:      Mental Status: He is alert  Psychiatric:         Behavior: Behavior normal          Thought Content:  Thought content normal                 Felisa Abdi DO

## 2021-09-17 ENCOUNTER — OFFICE VISIT (OUTPATIENT)
Dept: FAMILY MEDICINE CLINIC | Facility: CLINIC | Age: 36
End: 2021-09-17
Payer: COMMERCIAL

## 2021-09-17 VITALS
SYSTOLIC BLOOD PRESSURE: 120 MMHG | TEMPERATURE: 99 F | DIASTOLIC BLOOD PRESSURE: 70 MMHG | HEART RATE: 88 BPM | HEIGHT: 68 IN | BODY MASS INDEX: 28.49 KG/M2 | RESPIRATION RATE: 16 BRPM | WEIGHT: 188 LBS

## 2021-09-17 DIAGNOSIS — E66.9 OBESITY (BMI 30-39.9): ICD-10-CM

## 2021-09-17 DIAGNOSIS — M25.531 WRIST PAIN, CHRONIC, RIGHT: Primary | ICD-10-CM

## 2021-09-17 DIAGNOSIS — G89.29 WRIST PAIN, CHRONIC, RIGHT: Primary | ICD-10-CM

## 2021-09-17 PROCEDURE — 1036F TOBACCO NON-USER: CPT | Performed by: FAMILY MEDICINE

## 2021-09-17 PROCEDURE — 99213 OFFICE O/P EST LOW 20 MIN: CPT | Performed by: FAMILY MEDICINE

## 2021-09-17 PROCEDURE — 3008F BODY MASS INDEX DOCD: CPT | Performed by: FAMILY MEDICINE

## 2021-09-17 RX ORDER — PHENTERMINE HYDROCHLORIDE 37.5 MG/1
37.5 TABLET ORAL EVERY MORNING
Qty: 90 TABLET | Refills: 0 | Status: SHIPPED | OUTPATIENT
Start: 2021-09-17 | End: 2021-12-15 | Stop reason: SDUPTHER

## 2021-09-17 NOTE — PROGRESS NOTES
Assessment/Plan:    No problem-specific Assessment & Plan notes found for this encounter  Obesity improving, work on weight but also dimensions, try to move dinner calories to breakfast, f/u 1 month    Right wrist pain, probable chronic sprain, check XR, possible PT vs orthopedic eval     Diagnoses and all orders for this visit:    Wrist pain, chronic, right  -     XR wrist 3+ vw right; Future    BMI 28 0-28 9,adult    Obesity (BMI 30-39 9)  -     phentermine (ADIPEX-P) 37 5 MG tablet; Take 1 tablet (37 5 mg total) by mouth every morning (before breakfast)      Return in about 1 month (around 10/17/2021) for Recheck  Subjective:      Patient ID: Enmanuel Kidd is a 29 y o  male  Chief Complaint   Patient presents with    Follow-up     medication ac/lpn       HPI  Taking meds  Clothes still getting looser in past month and wagner in chest  Limits calories  Dinner is largest meal  Limits carbs  Does not eat breakfast    Right wrist pain  Years  Worse with extreme wrist ext/flex  Pushups also  Not left  No specific injury but works out a lot    Denies palpitations, chest pain, mood changes, irritability, EAST, edema or syncope  Use of phentermine as an adjunct to diet/exercise program aware  Accepts risks of medications as discussed  The following portions of the patient's history were reviewed and updated as appropriate: allergies, current medications, past family history, past medical history, past social history, past surgical history and problem list     Review of Systems   Respiratory: Negative for shortness of breath  Cardiovascular: Negative for chest pain, palpitations and leg swelling  Current Outpatient Medications   Medication Sig Dispense Refill    phentermine (ADIPEX-P) 37 5 MG tablet Take 1 tablet (37 5 mg total) by mouth every morning (before breakfast) 90 tablet 0     No current facility-administered medications for this visit         Objective:    /70   Pulse 88 Temp 99 °F (37 2 °C)   Resp 16   Ht 5' 8" (1 727 m)   Wt 85 3 kg (188 lb)   BMI 28 59 kg/m²        Physical Exam  Vitals and nursing note reviewed  Constitutional:       General: He is not in acute distress  Appearance: He is well-developed  He is not ill-appearing  HENT:      Head: Normocephalic  Right Ear: Tympanic membrane normal       Left Ear: Tympanic membrane normal    Eyes:      General: No scleral icterus  Conjunctiva/sclera: Conjunctivae normal    Cardiovascular:      Rate and Rhythm: Normal rate and regular rhythm  Heart sounds: No murmur heard  Pulmonary:      Effort: Pulmonary effort is normal  No respiratory distress  Breath sounds: No wheezing or rales  Abdominal:      Palpations: Abdomen is soft  Musculoskeletal:         General: Tenderness present  No deformity  Cervical back: Neck supple  Comments: With extreme flex/ext right wrist, no crepitus or synovitis or warmth/redness   Skin:     General: Skin is warm and dry  Coloration: Skin is not pale  Neurological:      Mental Status: He is alert  Psychiatric:         Behavior: Behavior normal          Thought Content:  Thought content normal                 Shakila Flaherty DO

## 2021-09-20 ENCOUNTER — APPOINTMENT (OUTPATIENT)
Dept: RADIOLOGY | Facility: CLINIC | Age: 36
End: 2021-09-20
Payer: COMMERCIAL

## 2021-09-20 DIAGNOSIS — G89.29 WRIST PAIN, CHRONIC, RIGHT: ICD-10-CM

## 2021-09-20 DIAGNOSIS — M25.531 WRIST PAIN, CHRONIC, RIGHT: ICD-10-CM

## 2021-09-20 PROCEDURE — 73110 X-RAY EXAM OF WRIST: CPT

## 2021-09-28 DIAGNOSIS — Z87.81 WRIST PAIN WITH OLD SCAPHOID FRACTURE DETERMINED BY X-RAY: Primary | ICD-10-CM

## 2021-09-28 DIAGNOSIS — M25.539 WRIST PAIN WITH OLD SCAPHOID FRACTURE DETERMINED BY X-RAY: Primary | ICD-10-CM

## 2021-09-29 ENCOUNTER — TELEPHONE (OUTPATIENT)
Dept: OBGYN CLINIC | Facility: HOSPITAL | Age: 36
End: 2021-09-29

## 2021-10-05 ENCOUNTER — OFFICE VISIT (OUTPATIENT)
Dept: OBGYN CLINIC | Facility: CLINIC | Age: 36
End: 2021-10-05
Payer: COMMERCIAL

## 2021-10-05 ENCOUNTER — APPOINTMENT (OUTPATIENT)
Dept: RADIOLOGY | Facility: CLINIC | Age: 36
End: 2021-10-05
Payer: COMMERCIAL

## 2021-10-05 VITALS
BODY MASS INDEX: 28.49 KG/M2 | HEIGHT: 68 IN | SYSTOLIC BLOOD PRESSURE: 114 MMHG | HEART RATE: 75 BPM | DIASTOLIC BLOOD PRESSURE: 77 MMHG | WEIGHT: 188 LBS

## 2021-10-05 DIAGNOSIS — M25.539 WRIST PAIN WITH OLD SCAPHOID FRACTURE DETERMINED BY X-RAY: ICD-10-CM

## 2021-10-05 DIAGNOSIS — Z87.81 WRIST PAIN WITH OLD SCAPHOID FRACTURE DETERMINED BY X-RAY: ICD-10-CM

## 2021-10-05 DIAGNOSIS — S62.001K CLOSED NONDISPLACED FRACTURE OF SCAPHOID OF RIGHT WRIST WITH NONUNION, UNSPECIFIED PORTION OF SCAPHOID, SUBSEQUENT ENCOUNTER: Primary | ICD-10-CM

## 2021-10-05 PROCEDURE — 73110 X-RAY EXAM OF WRIST: CPT

## 2021-10-05 PROCEDURE — 99204 OFFICE O/P NEW MOD 45 MIN: CPT | Performed by: ORTHOPAEDIC SURGERY

## 2021-10-11 ENCOUNTER — HOSPITAL ENCOUNTER (OUTPATIENT)
Dept: RADIOLOGY | Facility: HOSPITAL | Age: 36
Discharge: HOME | End: 2021-10-11
Attending: ORTHOPAEDIC SURGERY
Payer: COMMERCIAL

## 2021-10-11 DIAGNOSIS — S62.001K CLOSED NONDISPLACED FRACTURE OF SCAPHOID OF RIGHT WRIST WITH NONUNION, UNSPECIFIED PORTION OF SCAPHOID, SUBSEQUENT ENCOUNTER: ICD-10-CM

## 2021-10-11 DIAGNOSIS — M25.539 WRIST PAIN WITH OLD SCAPHOID FRACTURE DETERMINED BY X-RAY: ICD-10-CM

## 2021-10-11 DIAGNOSIS — Z87.81 WRIST PAIN WITH OLD SCAPHOID FRACTURE DETERMINED BY X-RAY: ICD-10-CM

## 2021-10-11 PROCEDURE — 73200 CT UPPER EXTREMITY W/O DYE: CPT

## 2021-10-11 PROCEDURE — G1004 CDSM NDSC: HCPCS

## 2021-10-12 ENCOUNTER — TELEPHONE (OUTPATIENT)
Dept: FAMILY MEDICINE CLINIC | Facility: CLINIC | Age: 36
End: 2021-10-12

## 2021-10-12 ENCOUNTER — TELEPHONE (OUTPATIENT)
Dept: OBGYN CLINIC | Facility: HOSPITAL | Age: 36
End: 2021-10-12

## 2021-10-19 ENCOUNTER — OFFICE VISIT (OUTPATIENT)
Dept: FAMILY MEDICINE CLINIC | Facility: CLINIC | Age: 36
End: 2021-10-19
Payer: COMMERCIAL

## 2021-10-19 VITALS
HEIGHT: 68 IN | RESPIRATION RATE: 16 BRPM | DIASTOLIC BLOOD PRESSURE: 80 MMHG | BODY MASS INDEX: 28.64 KG/M2 | TEMPERATURE: 97.4 F | OXYGEN SATURATION: 97 % | WEIGHT: 189 LBS | HEART RATE: 85 BPM | SYSTOLIC BLOOD PRESSURE: 108 MMHG

## 2021-10-19 DIAGNOSIS — M25.539 WRIST PAIN WITH OLD SCAPHOID FRACTURE DETERMINED BY X-RAY: ICD-10-CM

## 2021-10-19 DIAGNOSIS — Z87.81 WRIST PAIN WITH OLD SCAPHOID FRACTURE DETERMINED BY X-RAY: ICD-10-CM

## 2021-10-19 DIAGNOSIS — E66.3 OVERWEIGHT (BMI 25.0-29.9): Primary | ICD-10-CM

## 2021-10-19 PROCEDURE — 1036F TOBACCO NON-USER: CPT | Performed by: FAMILY MEDICINE

## 2021-10-19 PROCEDURE — 99213 OFFICE O/P EST LOW 20 MIN: CPT | Performed by: FAMILY MEDICINE

## 2021-10-19 PROCEDURE — 3725F SCREEN DEPRESSION PERFORMED: CPT | Performed by: FAMILY MEDICINE

## 2021-10-19 PROCEDURE — 3008F BODY MASS INDEX DOCD: CPT | Performed by: FAMILY MEDICINE

## 2021-11-02 ENCOUNTER — HOSPITAL ENCOUNTER (OUTPATIENT)
Dept: RADIOLOGY | Facility: HOSPITAL | Age: 36
Discharge: HOME | End: 2021-11-02
Attending: ORTHOPAEDIC SURGERY
Payer: COMMERCIAL

## 2021-11-02 DIAGNOSIS — S62.001K CLOSED NONDISPLACED FRACTURE OF SCAPHOID OF RIGHT WRIST WITH NONUNION, UNSPECIFIED PORTION OF SCAPHOID, SUBSEQUENT ENCOUNTER: ICD-10-CM

## 2021-11-02 DIAGNOSIS — M25.539 WRIST PAIN WITH OLD SCAPHOID FRACTURE DETERMINED BY X-RAY: ICD-10-CM

## 2021-11-02 DIAGNOSIS — Z87.81 WRIST PAIN WITH OLD SCAPHOID FRACTURE DETERMINED BY X-RAY: ICD-10-CM

## 2021-11-02 PROCEDURE — G1004 CDSM NDSC: HCPCS

## 2021-11-02 PROCEDURE — 73221 MRI JOINT UPR EXTREM W/O DYE: CPT

## 2021-11-09 ENCOUNTER — OFFICE VISIT (OUTPATIENT)
Dept: OBGYN CLINIC | Facility: CLINIC | Age: 36
End: 2021-11-09
Payer: COMMERCIAL

## 2021-11-09 VITALS
SYSTOLIC BLOOD PRESSURE: 115 MMHG | HEART RATE: 88 BPM | DIASTOLIC BLOOD PRESSURE: 78 MMHG | WEIGHT: 189 LBS | HEIGHT: 68 IN | BODY MASS INDEX: 28.64 KG/M2

## 2021-11-09 DIAGNOSIS — S62.001S SNAC (SCAPHOID NON-UNION ADVANCED COLLAPSE) OF WRIST, RIGHT: Primary | ICD-10-CM

## 2021-11-09 DIAGNOSIS — M19.131 SNAC (SCAPHOID NON-UNION ADVANCED COLLAPSE) OF WRIST, RIGHT: Primary | ICD-10-CM

## 2021-11-09 PROCEDURE — 99213 OFFICE O/P EST LOW 20 MIN: CPT | Performed by: ORTHOPAEDIC SURGERY

## 2021-11-09 PROCEDURE — 3008F BODY MASS INDEX DOCD: CPT | Performed by: ORTHOPAEDIC SURGERY

## 2021-11-09 PROCEDURE — 1036F TOBACCO NON-USER: CPT | Performed by: ORTHOPAEDIC SURGERY

## 2021-12-08 ENCOUNTER — RA CDI HCC (OUTPATIENT)
Dept: OTHER | Facility: HOSPITAL | Age: 36
End: 2021-12-08

## 2021-12-15 ENCOUNTER — OFFICE VISIT (OUTPATIENT)
Dept: FAMILY MEDICINE CLINIC | Facility: CLINIC | Age: 36
End: 2021-12-15
Payer: COMMERCIAL

## 2021-12-15 VITALS
BODY MASS INDEX: 28.37 KG/M2 | SYSTOLIC BLOOD PRESSURE: 126 MMHG | HEART RATE: 82 BPM | TEMPERATURE: 98.8 F | RESPIRATION RATE: 16 BRPM | WEIGHT: 187.2 LBS | DIASTOLIC BLOOD PRESSURE: 82 MMHG | HEIGHT: 68 IN

## 2021-12-15 DIAGNOSIS — E66.3 OVERWEIGHT (BMI 25.0-29.9): Primary | ICD-10-CM

## 2021-12-15 PROBLEM — E66.9 OBESITY (BMI 30-39.9): Status: RESOLVED | Noted: 2021-09-17 | Resolved: 2021-12-15

## 2021-12-15 PROCEDURE — 1036F TOBACCO NON-USER: CPT | Performed by: FAMILY MEDICINE

## 2021-12-15 PROCEDURE — 99213 OFFICE O/P EST LOW 20 MIN: CPT | Performed by: FAMILY MEDICINE

## 2021-12-15 PROCEDURE — 3008F BODY MASS INDEX DOCD: CPT | Performed by: FAMILY MEDICINE

## 2021-12-15 RX ORDER — PHENTERMINE HYDROCHLORIDE 37.5 MG/1
37.5 TABLET ORAL EVERY MORNING
Qty: 90 TABLET | Refills: 0 | Status: SHIPPED | OUTPATIENT
Start: 2021-12-15

## 2021-12-30 ENCOUNTER — TELEPHONE (OUTPATIENT)
Dept: FAMILY MEDICINE CLINIC | Facility: CLINIC | Age: 36
End: 2021-12-30

## 2022-05-10 ENCOUNTER — APPOINTMENT (OUTPATIENT)
Dept: RADIOLOGY | Facility: CLINIC | Age: 37
End: 2022-05-10
Payer: COMMERCIAL

## 2022-05-10 ENCOUNTER — OFFICE VISIT (OUTPATIENT)
Dept: OBGYN CLINIC | Facility: CLINIC | Age: 37
End: 2022-05-10
Payer: COMMERCIAL

## 2022-05-10 VITALS
BODY MASS INDEX: 28.79 KG/M2 | HEIGHT: 68 IN | WEIGHT: 190 LBS | SYSTOLIC BLOOD PRESSURE: 116 MMHG | HEART RATE: 60 BPM | DIASTOLIC BLOOD PRESSURE: 72 MMHG

## 2022-05-10 DIAGNOSIS — S62.001S SNAC (SCAPHOID NON-UNION ADVANCED COLLAPSE) OF WRIST, RIGHT: Primary | ICD-10-CM

## 2022-05-10 DIAGNOSIS — M19.131 SNAC (SCAPHOID NON-UNION ADVANCED COLLAPSE) OF WRIST, RIGHT: ICD-10-CM

## 2022-05-10 DIAGNOSIS — S62.001S SNAC (SCAPHOID NON-UNION ADVANCED COLLAPSE) OF WRIST, RIGHT: ICD-10-CM

## 2022-05-10 DIAGNOSIS — M19.131 SNAC (SCAPHOID NON-UNION ADVANCED COLLAPSE) OF WRIST, RIGHT: Primary | ICD-10-CM

## 2022-05-10 PROCEDURE — 99213 OFFICE O/P EST LOW 20 MIN: CPT | Performed by: ORTHOPAEDIC SURGERY

## 2022-05-10 PROCEDURE — 73110 X-RAY EXAM OF WRIST: CPT

## 2022-05-10 PROCEDURE — 3008F BODY MASS INDEX DOCD: CPT | Performed by: ORTHOPAEDIC SURGERY

## 2022-05-10 PROCEDURE — 1036F TOBACCO NON-USER: CPT | Performed by: ORTHOPAEDIC SURGERY

## 2022-05-10 NOTE — PROGRESS NOTES
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, right  XR wrist 3+ vw right       Scribe Attestation    I,:  Jacinta Frazier MA am acting as a scribe while in the presence of the attending physician :       I,:  Andriy Ivan DO personally performed the services described in this documentation    as scribed in my presence  :             58-year-old male with right SNAC wrist  Patient is doing well  X-rays are relatively unchanged  Patient's pain is unchanged  We did discuss a steroid injection today  Patient declined as he states this is not very bothersome for him  He can continue with the cock-up wrist brace as needed  He can ise and use Voltaren Gel OTC for pain  He may follow up with Dr Sarahi Kitchen or Dr Gillian Schwarz in 3 months for repeat evaluation  Subjective:   Veronika Morton is a 40 y o  male who presents to the office today for follow up evaluation right wrist SNAC wrist  Patient states he is doing well  He states his pain is unchanged since his last visit  Patient does note swelling to the dorsal aspect of his wrist  He does use the cock-up wrist brace for activity  Patient is a corrections office  Review of Systems   Constitutional: Negative for chills and fever  HENT: Negative for drooling and sneezing  Eyes: Negative for redness  Respiratory: Negative for cough and wheezing  Gastrointestinal: Negative for nausea and vomiting  Musculoskeletal: Negative for arthralgias, joint swelling and myalgias  Neurological: Negative for weakness and numbness  Psychiatric/Behavioral: Negative for behavioral problems  The patient is not nervous/anxious  History reviewed  No pertinent past medical history  History reviewed  No pertinent surgical history      Family History   Problem Relation Age of Onset    Coronary artery disease Father         Premature CAD    Hypertension Father     Arthritis Mother        Social History     Occupational History    Not on file   Tobacco Use    Smoking status: Never Smoker    Smokeless tobacco: Current User   Vaping Use    Vaping Use: Never used   Substance and Sexual Activity    Alcohol use: Yes     Alcohol/week: 5 0 standard drinks     Types: 5 Cans of beer per week    Drug use: Never    Sexual activity: Not on file         Current Outpatient Medications:     phentermine (ADIPEX-P) 37 5 MG tablet, Take 1 tablet (37 5 mg total) by mouth every morning (before breakfast), Disp: 90 tablet, Rfl: 0    Allergies   Allergen Reactions    Other Sneezing     Seasonal       Objective:  Vitals:    05/10/22 1258   BP: 116/72   Pulse: 60       Ortho Exam     Right wrist    90 ext  45 flex  FDS FDP ext intact  Full pronation and supination   Compartments soft  Brisk capillary refill  S/m intact median, radial, and ulnar nerve     Physical Exam  Constitutional:       Appearance: He is well-developed  HENT:      Head: Normocephalic and atraumatic  Eyes:      General:         Right eye: No discharge  Left eye: No discharge  Conjunctiva/sclera: Conjunctivae normal    Cardiovascular:      Rate and Rhythm: Normal rate  Pulmonary:      Effort: Pulmonary effort is normal  No respiratory distress  Musculoskeletal:      Cervical back: Normal range of motion and neck supple  Comments: As noted in HPI   Skin:     General: Skin is warm and dry  Neurological:      Mental Status: He is alert and oriented to person, place, and time  Psychiatric:         Behavior: Behavior normal          Thought Content:  Thought content normal          Judgment: Judgment normal          I have personally reviewed pertinent films in PACS and my interpretation is as follows:X-ray right wrist performed in the office today demonstrates SNAC wrist

## 2022-08-10 VITALS
WEIGHT: 186 LBS | DIASTOLIC BLOOD PRESSURE: 81 MMHG | HEIGHT: 69 IN | HEART RATE: 79 BPM | RESPIRATION RATE: 16 BRPM | SYSTOLIC BLOOD PRESSURE: 123 MMHG | BODY MASS INDEX: 27.55 KG/M2

## 2022-08-10 DIAGNOSIS — Z87.81 WRIST PAIN WITH OLD SCAPHOID FRACTURE DETERMINED BY X-RAY: ICD-10-CM

## 2022-08-10 DIAGNOSIS — S62.001K CLOSED NONDISPLACED FRACTURE OF SCAPHOID OF RIGHT WRIST WITH NONUNION, UNSPECIFIED PORTION OF SCAPHOID, SUBSEQUENT ENCOUNTER: ICD-10-CM

## 2022-08-10 DIAGNOSIS — S62.001S SNAC (SCAPHOID NON-UNION ADVANCED COLLAPSE) OF WRIST, RIGHT: Primary | ICD-10-CM

## 2022-08-10 DIAGNOSIS — M25.539 WRIST PAIN WITH OLD SCAPHOID FRACTURE DETERMINED BY X-RAY: ICD-10-CM

## 2022-08-10 DIAGNOSIS — M19.131 SNAC (SCAPHOID NON-UNION ADVANCED COLLAPSE) OF WRIST, RIGHT: Primary | ICD-10-CM

## 2022-08-10 PROCEDURE — 99203 OFFICE O/P NEW LOW 30 MIN: CPT | Performed by: SURGERY

## 2022-08-10 NOTE — TELEPHONE ENCOUNTER
Patient sees Dr Alanna Duane      Patient is calling stating he forgot to ask for a note for work excusing him for today 8/10        Patient will look up the note in [place]

## 2022-08-10 NOTE — PROGRESS NOTES
José KOEEFE  Attending, Orthopaedic Surgery  Hand, Wrist, and Elbow Surgery  [facility]      ORTHOPAEDIC HAND, WRIST, AND ELBOW OFFICE  VISIT       ASSESSMENT/PLAN:      40 y o  male with right SNAC wrist      The etiology of the above diagnosis was discussed along with treatment options  Clyde pain is intermittent and mild at this time  If pain were to worsen, we discussed a CSI prior to surgical intervention  He does have a few different options with regards to salvage procedures in the future  He may benefit from a proximal row carpectomy, scaphoid excision 4 corner fusion or distal resection of the scaphoid with resection of the radial styloid so this does not generate pain  In the mean time he will continue OTC pain medication and bracing as needed  Repeating x-rays every few months is not needed, but may be repeated in a few years  I will see Dana Mcdaniel back in the office on an as needed basis  The patient verbalized understanding of exam findings and treatment plan  We engaged in the shared decision-making process and treatment options were discussed at length with the patient  Surgical and conservative management discussed today along with risks and benefits  Diagnoses and all orders for this visit:    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, right    Wrist pain with old scaphoid fracture determined by x-ray    Closed nondisplaced fracture of scaphoid of right wrist with nonunion, unspecified portion of scaphoid, subsequent encounter      Follow Up:  Return if symptoms worsen or fail to improve      To Do Next Visit:  Re-evaluation of current issue    ____________________________________________________________________________________________________________________________________________      CHIEF COMPLAINT:  Chief Complaint   Patient presents with    Right Wrist - Pain       SUBJECTIVE:  Gen Romero is a 40y o  year old RHD male who presents to the office today for right SNAC wrist  I am seeing Shasha Adams in consultation at the request of Dr Rand Elias  He notes intermittent pain to the dorsal aspect of his right wrist  Pain has been ongoing for a few years  He notes pain will worsen with twisting motions or overuse at times  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  He has a cock up wrist brace that he will wear on an as needed basis  He will also take Tylenol or NSAIDs on an as needed basis  He denies any previous fractures or injuries to his right wrist in the past       Pain/symptom timing:  Worse during the day when active  Pain/symptom context:  Worse with activites and work  Pain/symptom modifying factors:  Rest makes better, activities make worse  Pain/symptom associated signs/symptoms: none    Prior treatment   · NSAIDsYes   · Injections No   · Bracing/Orthotics Yes    Physical Therapy No     I have personally reviewed all the relevant PMH, PSH, SH, FH, Medications and allergies      PAST MEDICAL HISTORY:  Past Medical History:   Diagnosis Date    Seasonal allergies        PAST SURGICAL HISTORY:  History reviewed  No pertinent surgical history  FAMILY HISTORY:  Family History   Problem Relation Age of Onset    Coronary artery disease Father         Premature CAD    Hypertension Father     Arthritis Mother        SOCIAL HISTORY:  Social History     Tobacco Use    Smoking status: Never Smoker    Smokeless tobacco: Current User   Vaping Use    Vaping Use: Never used   Substance Use Topics    Alcohol use:  Yes     Alcohol/week: 5 0 standard drinks     Types: 5 Cans of beer per week    Drug use: Never       MEDICATIONS:    Current Outpatient Medications:     phentermine (ADIPEX-P) 37 5 MG tablet, Take 1 tablet (37 5 mg total) by mouth every morning (before breakfast) (Patient not taking: Reported on 8/10/2022), Disp: 90 tablet, Rfl: 0    ALLERGIES:  Allergies   Allergen Reactions    Other Sneezing Seasonal           REVIEW OF SYSTEMS:  Review of Systems   Constitutional: Negative for chills, fever and unexpected weight change  HENT: Negative for hearing loss, nosebleeds and sore throat  Eyes: Negative for pain, redness and visual disturbance  Respiratory: Negative for cough, shortness of breath and wheezing  Cardiovascular: Negative for chest pain, palpitations and leg swelling  Gastrointestinal: Negative for abdominal pain, nausea and vomiting  Endocrine: Negative for polydipsia and polyuria  Genitourinary: Negative for difficulty urinating and hematuria  Musculoskeletal: Positive for arthralgias ( at times)  Negative for joint swelling and myalgias  Skin: Negative for rash and wound  Neurological: Negative for dizziness, numbness and headaches  Psychiatric/Behavioral: Negative for decreased concentration, dysphoric mood and suicidal ideas  The patient is not nervous/anxious  VITALS:  Vitals:    08/10/22 1409   BP: 123/81   Pulse: 79   Resp: 16       LABS:  HgA1c: No results found for: HGBA1C  BMP:   Lab Results   Component Value Date    K 5 5 (H) 09/18/2020    CO2 24 09/18/2020     09/18/2020    BUN 12 09/18/2020    CREATININE 0 87 09/18/2020       _____________________________________________________  PHYSICAL EXAMINATION:  General: well developed and well nourished, alert, oriented times 3 and appears comfortable  Psychiatric: Normal  HEENT: Normocephalic, Atraumatic Trachea Midline, No torticollis  Pulmonary: No audible wheezing or respiratory distress   Abdomen/GI: Non tender, non distended   Cardiovascular: No pitting edema, 2+ radial pulse   Skin: No masses, erythema, lacerations, fluctation, ulcerations  Neurovascular: Sensation Intact to the Median, Ulnar, Radial Nerve, Motor Intact to the Median, Ulnar, Radial Nerve and Pulses Intact  Musculoskeletal: Normal, except as noted in detailed exam and in HPI        MUSCULOSKELETAL EXAMINATION:    Left wrist:     No erythema, ecchymosis or edema  Wrist flexion aprox  45 degrees   Wrist extension aprox   45 degrees  STT joint mildly tender to palpation   Mild extensor tendon tenderness   Full composite fist   2+ radial pulse     ___________________________________________________  STUDIES REVIEWED:  I have personally reviewed AP lateral and oblique radiographs of right wrist   which demonstrate scaphoid nonunion advanced collapse with scoliosis of the distal pole and radial scaphoid arthritis           PROCEDURES PERFORMED:  Procedures  No Procedures performed today    _____________________________________________________      Bhargav Clifford    I,:  Aundrea Frazier am acting as a scribe while in the presence of the attending physician :       I,:  Arlyn Murphy MD personally performed the services described in this documentation    as scribed in my presence :

## 2022-10-12 PROBLEM — Z00.00 HEALTHCARE MAINTENANCE: Status: RESOLVED | Noted: 2021-01-19 | Resolved: 2022-10-12

## 2024-02-21 PROBLEM — Z13.89 SCREENING FOR CARDIOVASCULAR, RESPIRATORY, AND GENITOURINARY DISEASES: Status: RESOLVED | Noted: 2018-11-21 | Resolved: 2024-02-21

## 2024-02-21 PROBLEM — Z13.83 SCREENING FOR CARDIOVASCULAR, RESPIRATORY, AND GENITOURINARY DISEASES: Status: RESOLVED | Noted: 2018-11-21 | Resolved: 2024-02-21

## 2024-02-21 PROBLEM — Z13.1 SCREENING FOR DIABETES MELLITUS (DM): Status: RESOLVED | Noted: 2018-11-21 | Resolved: 2024-02-21

## 2024-02-21 PROBLEM — Z13.6 SCREENING FOR CARDIOVASCULAR, RESPIRATORY, AND GENITOURINARY DISEASES: Status: RESOLVED | Noted: 2018-11-21 | Resolved: 2024-02-21

## 2025-01-17 ENCOUNTER — OFFICE VISIT (OUTPATIENT)
Dept: URGENT CARE | Facility: CLINIC | Age: 40
End: 2025-01-17
Payer: COMMERCIAL

## 2025-01-17 VITALS
SYSTOLIC BLOOD PRESSURE: 130 MMHG | BODY MASS INDEX: 33.37 KG/M2 | WEIGHT: 226 LBS | RESPIRATION RATE: 12 BRPM | HEART RATE: 75 BPM | TEMPERATURE: 98.1 F | DIASTOLIC BLOOD PRESSURE: 77 MMHG

## 2025-01-17 DIAGNOSIS — R68.89 FLU-LIKE SYMPTOMS: Primary | ICD-10-CM

## 2025-01-17 PROCEDURE — 99213 OFFICE O/P EST LOW 20 MIN: CPT

## 2025-01-17 NOTE — PROGRESS NOTES
[facility]        NAME: Clyde Clarke is a 39 y.o. male  : 1985    MRN: [id number]  DATE: 2025  TIME: 8:41 AM    Assessment and Plan   Flu-like symptoms [R68.89]  1. Flu-like symptoms          Declined COVID/Flu testing at this time. Work note provided.     Patient Instructions       Most upper respiratory infections are viral and resolve on their own within 10-14 days. Antibiotics are not indicated for the viral infection, and are only prescribed if there is evidence for a bacterial infection. Viral infections are the most common, with bacterial infections only accounting for 0.5-2 percent of cases. Sometimes an upper respiratory infection may lead to secondary bacterial infection, such as bacterial sinusitis, in which case antibiotics would be indicated at that time. If your symptoms continue beyond 10-14 days or if you experience ongoing fevers, productive cough with green, brown, bloody phlegm production, you may have developed a bacterial infection. For the uncomplicated viral upper respiratory infection conservative management includes:    Fever and pain control:  Ibuprofen (Motrin) 600mg every 6 hours for fever, headaches, body aches   Ibuprofen is an NSAID. Please stop medication if you experience stomach/abdominal pain and report to your primary care provider.   Ask your primary care provider before you take NSAIDs if you are on any blood thinners, or if you have a history of heart disease, kidney disease, gastric bypass surgery, GI bleed, or poorly controlled high blood pressure.   May use acetaminophen (Tylenol) as directed on the bottle between doses of ibuprofen. Do not exceed 4,000mg of Tylenol a day.   Cough & Congestion:  Guaifenesin (Mucinex) as directed on the bottle for congestion and mucous-y cough.   Dextromethorphan (Delsym, Robitussin) for dry cough and cough suppression   Pseudoephedrine (Sudafed) for congestion and sinus pressure   Sudafed may cause  increased heart rate, irregular heart rate, and an increase in blood pressure. Please do not take Sudafed if you have a history of heart disease or high blood pressure.   Sudafed should not be taken if you are on anti-depressants such as those belonging to the class MAOIs or tricyclics.  Coricidin HBP (chlorpheniramine maleate) can be used as a decongestant in place of other options for those unable to take Sudafed.   Combination cough and cold such as Dimetapp and Mucinex DM also available  Sudafed PE Head Congestion +Flu Severe contains a combination of Sudafed, Tylenol, Mucinex, and Delsym  If prescribed, take Tessalon Pearles or Bromfed/Phenergan DM as directed  Avoid taking prescription cough/congestion medication and OTC options at the same time  Sore Throat:  Cepacol lozenges  Chloraseptic spray  Throat Coat tea  Warm salt water gargles   Vitamin/Minerals:  Vitamin D3 2,000 IU daily  Vitamin C 1000mg twice a day  Some studies suggest that Zinc 12.5-15mg every 2 hours while awake for 5 days may shorten symptom duration by 1-2 days  Other:   Plenty of fluids and rest  Cool mist humidifiers  Nasal sinus rinses such as NettiPot, Neimed, or Navage can be used to help flush out sinuses  Please only use distilled/sterile water that can be purchased at your local pharmacy  Nasal spray options:  Nasal steroid sprays such as Flonase, Nasonex, Nasacort may help with sinus congestion, itchy/watery eyes, clogged ears  These options must be used consistently for at least 2 weeks for full effect  Afrin nasal spray for quick acting congestion relief  Saline nasal spray for dry nose, irritation of the nasal passages  Follow up with PCP in 3-5 days  Proceed to the ED if symptoms worsen      If tests are performed, our office will contact you with results only if changes need to made to the care plan discussed with you at the visit. You can review your full results on [facility] Mychart.    Chief Complaint     Chief Complaint    Patient presents with    Nasal Congestion     Reports head congestion, body aches, fatigue, chills and headache that started Wednesday evening. Was unable to go to work yesterday or today. Requesting note for work,. Used Tylenol.         History of Present Illness       Generalized Body Aches  The current episode started in the past 7 days. The problem occurs constantly. The problem is unchanged. The pain is moderate. Associated symptoms include congestion, headaches and fatigue. Pertinent negatives include no rhinorrhea, sore throat, fever, chest pain, coughing, shortness of breath, abdominal pain, nausea or vomiting. Past treatments include acetaminophen. The treatment provided mild relief.       Review of Systems   Review of Systems   Constitutional:  Positive for chills and fatigue. Negative for fever.   HENT:  Positive for congestion and sinus pressure. Negative for postnasal drip, rhinorrhea, sore throat and trouble swallowing.    Respiratory:  Negative for cough, chest tightness and shortness of breath.    Cardiovascular:  Negative for chest pain and palpitations.   Gastrointestinal:  Negative for abdominal pain, nausea and vomiting.   Genitourinary:  Negative for difficulty urinating.   Musculoskeletal:  Positive for myalgias.   Neurological:  Positive for headaches. Negative for dizziness.         Current Medications       Current Outpatient Medications:     phentermine (ADIPEX-P) 37.5 MG tablet, Take 1 tablet (37.5 mg total) by mouth every morning (before breakfast) (Patient not taking: Reported on 8/10/2022), Disp: 90 tablet, Rfl: 0    Current Allergies     Allergies as of 01/17/2025 - Reviewed 01/17/2025   Allergen Reaction Noted    Other Sneezing 01/19/2021            The following portions of the patient's history were reviewed and updated as appropriate: allergies, current medications, past family history, past medical history, past social history, past surgical history and problem list.     Past  Medical History:   Diagnosis Date    Seasonal allergies        History reviewed. No pertinent surgical history.    Family History   Problem Relation Age of Onset    Coronary artery disease Father         Premature CAD    Hypertension Father     Arthritis Mother          Medications have been verified.        Objective   /77   Pulse 75   Temp 98.1 °F (36.7 °C) (Tympanic)   Resp 12   Wt 103 kg (226 lb)   BMI 33.37 kg/m²        Physical Exam     Physical Exam  Constitutional:       General: He is not in acute distress.     Appearance: He is ill-appearing.   HENT:      Nose: Congestion and rhinorrhea present.      Mouth/Throat:      Mouth: Mucous membranes are moist.      Pharynx: Oropharynx is clear.   Eyes:      Pupils: Pupils are equal, round, and reactive to light.   Cardiovascular:      Rate and Rhythm: Normal rate and regular rhythm.      Pulses: Normal pulses.      Heart sounds: Normal heart sounds. No murmur heard.     No gallop.   Pulmonary:      Effort: Pulmonary effort is normal. No respiratory distress.      Breath sounds: Normal breath sounds. No wheezing.   Abdominal:      General: Abdomen is flat. Bowel sounds are normal. There is no distension.      Palpations: Abdomen is soft.      Tenderness: There is no abdominal tenderness.   Musculoskeletal:         General: Normal range of motion.      Cervical back: Normal range of motion.   Skin:     General: Skin is warm and dry.      Capillary Refill: Capillary refill takes less than 2 seconds.   Neurological:      Mental Status: He is alert and oriented to person, place, and time.

## 2025-01-17 NOTE — LETTER
January 17, 2025     Patient: Clyde Clarke   YOB: 1985   Date of Visit: 1/17/2025       To Whom it May Concern:    Clyde Clarke was seen in my clinic on 1/17/2025. He may return to work on 1/20/2025 .    If you have any questions or concerns, please don't hesitate to call.         Sincerely,          Elena Carmona PA-C        CC: No Recipients